# Patient Record
(demographics unavailable — no encounter records)

---

## 2024-11-03 NOTE — PHYSICAL EXAM
[No Rash or Lesion] : No rash or lesion [Alert] : alert [Oriented to Person] : oriented to person [Oriented to Place] : oriented to place [Oriented to Time] : oriented to time [Calm] : calm [de-identified] : No acute distress [de-identified] : Nonlabored breathing [de-identified] : soft, non-tender, non-distended, reducible left inguinal hernia

## 2024-11-03 NOTE — ASSESSMENT
[FreeTextEntry1] : This is a very pleasant 86F who is here for interval evaluation of left inguinal hernia. She now desires operative repair.  She expressed interest in open repair. The risks/benefits of this were discussed at length and she would like to proceed with open left inguinal hernia repair with mesh and all indicated procedures.  She will see her physician for preop clearance.

## 2024-11-03 NOTE — HISTORY OF PRESENT ILLNESS
[de-identified] : This is a very pleasant 86F who I had seen in the past for evaluation of left inguinal hernia. She has had this for a few years now, but it doesn't hurt her. She noticed a bulge that is always there but always reduces when she lies down. No episode of bulge that does not reduce, nausea, vomiting or obstructive symptoms or pain. She has noted some occasional discomfort in the pelvis that comes and goes and improves with BM that she is not sure is related. She is an active 86F.

## 2025-01-02 NOTE — HISTORY OF PRESENT ILLNESS
[No Pertinent Cardiac History] : no history of aortic stenosis, atrial fibrillation, coronary artery disease, recent myocardial infarction, or implantable device/pacemaker [No Adverse Anesthesia Reaction] : no adverse anesthesia reaction in self or family member [(Patient denies any chest pain, claudication, dyspnea on exertion, orthopnea, palpitations or syncope)] : Patient denies any chest pain, claudication, dyspnea on exertion, orthopnea, palpitations or syncope [Chronic Anticoagulation] : no chronic anticoagulation [Chronic Kidney Disease] : no chronic kidney disease [Diabetes] : no diabetes [FreeTextEntry4] : 85 yo F PMH HTN, HLD osteoporosis, carotid plaque, former smoker (reports social quit 12-15 years ago) presenting today for preoperative exam prior to hernia repair scheduled with Dr. Loving for 01/06/25.

## 2025-01-02 NOTE — ASSESSMENT
[High Risk Surgery - Intraperitoneal, Intrathoracic or Supringuinal Vascular Procedures] : High Risk Surgery - Intraperitoneal, Intrathoracic or Supringuinal Vascular Procedures - No (0) [Ischemic Heart Disease] : Ischemic Heart Disease - No (0) [Congestive Heart Failure] : Congestive Heart Failure - No (0) [Prior Cerebrovascular Accident or TIA] : Prior Cerebrovascular Accident or TIA - No (0) [Creatinine >= 2mg/dL (1 Point)] : Creatinine >= 2mg/dL - No (0) [Insulin-dependent Diabetic (1 Point)] : Insulin-dependent Diabetic - No (0) [0] : 0 , RCRI Class: I, Risk of Post-Op Cardiac Complications: 3.9%, 95% CI for Risk Estimate: 2.8% - 5.4% [As per surgery] : as per surgery [Patient Optimized for Surgery] : Patient optimized for surgery [FreeTextEntry4] : 85 yo F PMH HTN, HLD osteoporosis, carotid plaque, former smoker (reports social quit 12-15 years ago) presenting today for preoperative exam prior to hernia repair

## 2025-01-02 NOTE — PLAN
[FreeTextEntry1] : CBC and BMP wnl EKG sinus- similar to previous  patient is medically optimized as intermediate risk for planned procedure.

## 2025-01-02 NOTE — HISTORY OF PRESENT ILLNESS
[No Pertinent Cardiac History] : no history of aortic stenosis, atrial fibrillation, coronary artery disease, recent myocardial infarction, or implantable device/pacemaker [No Adverse Anesthesia Reaction] : no adverse anesthesia reaction in self or family member [(Patient denies any chest pain, claudication, dyspnea on exertion, orthopnea, palpitations or syncope)] : Patient denies any chest pain, claudication, dyspnea on exertion, orthopnea, palpitations or syncope [Chronic Anticoagulation] : no chronic anticoagulation [Chronic Kidney Disease] : no chronic kidney disease [Diabetes] : no diabetes [FreeTextEntry4] : 87 yo F PMH HTN, HLD osteoporosis, carotid plaque, former smoker (reports social quit 12-15 years ago) presenting today for preoperative exam prior to hernia repair scheduled with Dr. Loving for 01/06/25.

## 2025-04-02 NOTE — PROCEDURE
[FreeTextEntry3] : Large joint injection was performed  of the b/l knees. The indication for this procedure was x-ray evidence of Osteoarthritis on this or prior visit. The site was prepped with alcohol and betadine. An injection of Lidocaine 3cc of 1% , Euflexxa 20mg, # 1 was used.   Patient was advised to call if redness, pain or fever occur and apply ice for 15 minutes out of every hour for the next 12-24 hours as tolerated.   Patient has tried OTC's including aspirin, Ibuprofen, Aleve, etc or prescription NSAIDS, and/or exercises at home and/or physical therapy without satisfactory response, patient had decreased mobility in the joint and the risks benefits, and alternatives have been discussed, and verbal consent was obtained.   The risks, benefits and contents of the injection have been discussed.  Risks include but are not limited to allergic reaction, flare reaction, permanent white skin discoloration at the injection site and infection.  The patient understands the risks and agrees to having the injection.  All questions have been answered.

## 2025-04-02 NOTE — PHYSICAL EXAM
[Left] : left elbow [NL (150)] : flexion 150 degrees [NL (90)] : supination 90 degrees [Bilateral] : knee bilaterally [NL (0)] : extension 0 degrees [Negative] : negative Shashank's [] : no pain with varus stress [TWNoteComboBox7] : flexion 125 degrees

## 2025-04-02 NOTE — HISTORY OF PRESENT ILLNESS
[de-identified] : 04/02/2025 Ms. CHACORTA REDDY, a 86 year old RHD female, presents today for bilateral knee pain and left elbow pain. Knee pain began February 2025. no specific PATRICIA. Pain is described as sharp. worsens at night and with stairs. NSAIDS alleviate pain. Denies numbness/tingling. XRay of both knees done at [facility] in August 15, 2024. Sept. 2024, Pt saw Dr. Guerin for aspiration and bilateral CSI. Pt is interested in gel injections. Pt states she had an allergic reaction to cortisone. Left Elbow: pt states she hit elbow no a door approx. 3wks ago and still c/o medial elbow pain. Tender to touch.

## 2025-04-02 NOTE — IMAGING
[Left] : left elbow [Bilateral] : knee bilaterally [Outside films reviewed] : Outside films reviewed [There are no fractures, subluxations or dislocations. No significant abnormalities are seen] : There are no fractures, subluxations or dislocations. No significant abnormalities are seen [Degenerative change] : Degenerative change [FreeTextEntry1] : independent interpretation on 04/02/2025 [FreeTextEntry9] : NW 8/15/24 - independent interpretation on 04/02/2025

## 2025-04-02 NOTE — DISCUSSION/SUMMARY
[de-identified] : 86f with bn/l knee djd and left elbow contusion  Arthritis is a progressive problem that once occurs will be a chronic issue that will likely continue until surgical treatment is necessary. Treatment options for arthritis include OTC NSAIDS, prescription NSAIDS, ice, bracing, physical therapy, cortisone and/or visco injections, and surgery for joint replacement.  discussed availability of visco injections and pt would like to proceed.  Euflexxa #1 b/l knees Injection tolerated well. Post injection instructions reviewed. 1) wbat, cryotherapy 2) rtc 1 week to continue series   Entered by Enid Kraft acting as scribe. Dr. Espino- The documentation recorded by the scribe accurately reflects the service I personally performed and the decisions made by me.

## 2025-04-09 NOTE — HISTORY OF PRESENT ILLNESS
[de-identified] : 04/09/25: Pt here for B/L Knee pain and to recieve Euflexxa #2 b/l knee injection. Pt states she had little relief from first injection. 04/02/2025 Ms. CHACORTA REDDY, a 86 year old RHD female, presents today for bilateral knee pain and left elbow pain. Knee pain began February 2025. no specific PATRICIA. Pain is described as sharp. worsens at night and with stairs. NSAIDS alleviate pain. Denies numbness/tingling. XRay of both knees done at [facility] in August 15, 2024. Sept. 2024, Pt saw Dr. Guerin for aspiration and bilateral CSI. Pt is interested in gel injections. Pt states she had an allergic reaction to cortisone. Left Elbow: pt states she hit elbow no a door approx. 3wks ago and still c/o medial elbow pain. Tender to touch.

## 2025-04-09 NOTE — PHYSICAL EXAM
[Left] : left elbow [NL (150)] : flexion 150 degrees [NL (90)] : supination 90 degrees [Bilateral] : knee bilaterally [NL (0)] : extension 0 degrees [Negative] : negative Shashank's [] : no pain with varus stress [FreeTextEntry3] : moderate effusion right knee [TWNoteComboBox7] : flexion 125 degrees

## 2025-04-09 NOTE — PROCEDURE
[FreeTextEntry3] : Aspiration  was performed of the right knee. Aspiration was indicated due to effusion. The amount aspirated was 25 cc. The amount aspirated was clear nsf. Patient tolerated procedure well.  Large joint injection was performed  of the b/l knees. The indication for this procedure was x-ray evidence of Osteoarthritis on this or prior visit. The site was prepped with alcohol and betadine. An injection of Lidocaine 3cc of 1% , Euflexxa 20mg, # 2 was used.   Patient was advised to call if redness, pain or fever occur and apply ice for 15 minutes out of every hour for the next 12-24 hours as tolerated.   Patient has tried OTC's including aspirin, Ibuprofen, Aleve, etc or prescription NSAIDS, and/or exercises at home and/or physical therapy without satisfactory response, patient had decreased mobility in the joint and the risks benefits, and alternatives have been discussed, and verbal consent was obtained.   The risks, benefits and contents of the injection have been discussed.  Risks include but are not limited to allergic reaction, flare reaction, permanent white skin discoloration at the injection site and infection.  The patient understands the risks and agrees to having the injection.  All questions have been answered.

## 2025-04-09 NOTE — DISCUSSION/SUMMARY
[de-identified] : 86f with b/l knee djd . Today with acute exacerbation of right knee djd with effusion Arthritis is a progressive problem that once occurs will be a chronic issue that will likely continue until surgical treatment is necessary. Treatment options for arthritis include OTC NSAIDS, prescription NSAIDS, ice, bracing, physical therapy, cortisone and/or visco injections, and surgery for joint replacement.  discussed availability of visco injections and pt would like to proceed.  Euflexxa #2 b/l knees Injection tolerated well. Post injection instructions reviewed. 1) wbat, cryotherapy 2) rtc 1 week to continue series   aspiration 25cc right knee  Entered by Enid Kraft acting as scribe. Dr. Espino- The documentation recorded by the scribe accurately reflects the service I personally performed and the decisions made by me.

## 2025-04-18 NOTE — DISCUSSION/SUMMARY
[de-identified] : 86f with b/l knee djd . Today with acute exacerbation of right knee djd with effusion Arthritis is a progressive problem that once occurs will be a chronic issue that will likely continue until surgical treatment is necessary. Treatment options for arthritis include OTC NSAIDS, prescription NSAIDS, ice, bracing, physical therapy, cortisone and/or visco injections, and surgery for joint replacement.  discussed availability of visco injections and pt would like to proceed.  Euflexxa #3 b/l knees Injection tolerated well. Post injection instructions reviewed. 1) wbat, cryotherapy 2) rtc PRN - discussed timing of repeat injectons

## 2025-04-18 NOTE — HISTORY OF PRESENT ILLNESS
[de-identified] :  04/16/2025: Pt here for B/L knee pain and to receive Euflexxa #3 b/l knee injection. Pt states she has little to no changes in pain level. 04/09/25: Pt here for B/L Knee pain and to recieve Euflexxa #2 b/l knee injection. Pt states she had little relief from first injection. 04/02/2025 Ms. CHACORTA REDDY, a 86 year old RHD female, presents today for bilateral knee pain and left elbow pain. Knee pain began February 2025. no specific PATRICIA. Pain is described as sharp. worsens at night and with stairs. NSAIDS alleviate pain. Denies numbness/tingling. XRay of both knees done at [facility] in August 15, 2024. Sept. 2024, Pt saw Dr. Guerin for aspiration and bilateral CSI. Pt is interested in gel injections. Pt states she had an allergic reaction to cortisone. Left Elbow: pt states she hit elbow no a door approx. 3wks ago and still c/o medial elbow pain. Tender to touch.

## 2025-04-18 NOTE — PROCEDURE
[FreeTextEntry3] :  Large joint injection was performed  of the b/l knees. The indication for this procedure was x-ray evidence of Osteoarthritis on this or prior visit. The site was prepped with alcohol and betadine. An injection of Lidocaine 3cc of 1% , Euflexxa 20mg, # 3 was used.   Patient was advised to call if redness, pain or fever occur and apply ice for 15 minutes out of every hour for the next 12-24 hours as tolerated.   Patient has tried OTC's including aspirin, Ibuprofen, Aleve, etc or prescription NSAIDS, and/or exercises at home and/or physical therapy without satisfactory response, patient had decreased mobility in the joint and the risks benefits, and alternatives have been discussed, and verbal consent was obtained.   The risks, benefits and contents of the injection have been discussed.  Risks include but are not limited to allergic reaction, flare reaction, permanent white skin discoloration at the injection site and infection.  The patient understands the risks and agrees to having the injection.  All questions have been answered.

## 2025-04-18 NOTE — DISCUSSION/SUMMARY
[de-identified] : 86f with b/l knee djd . Today with acute exacerbation of right knee djd with effusion Arthritis is a progressive problem that once occurs will be a chronic issue that will likely continue until surgical treatment is necessary. Treatment options for arthritis include OTC NSAIDS, prescription NSAIDS, ice, bracing, physical therapy, cortisone and/or visco injections, and surgery for joint replacement.  discussed availability of visco injections and pt would like to proceed.  Euflexxa #3 b/l knees Injection tolerated well. Post injection instructions reviewed. 1) wbat, cryotherapy 2) rtc PRN - discussed timing of repeat injectons

## 2025-04-18 NOTE — HISTORY OF PRESENT ILLNESS
[de-identified] :  04/16/2025: Pt here for B/L knee pain and to receive Euflexxa #3 b/l knee injection. Pt states she has little to no changes in pain level. 04/09/25: Pt here for B/L Knee pain and to recieve Euflexxa #2 b/l knee injection. Pt states she had little relief from first injection. 04/02/2025 Ms. CHACORTA REDDY, a 86 year old RHD female, presents today for bilateral knee pain and left elbow pain. Knee pain began February 2025. no specific PATRICIA. Pain is described as sharp. worsens at night and with stairs. NSAIDS alleviate pain. Denies numbness/tingling. XRay of both knees done at [facility] in August 15, 2024. Sept. 2024, Pt saw Dr. Guerin for aspiration and bilateral CSI. Pt is interested in gel injections. Pt states she had an allergic reaction to cortisone. Left Elbow: pt states she hit elbow no a door approx. 3wks ago and still c/o medial elbow pain. Tender to touch.

## 2025-06-11 NOTE — REVIEW OF SYSTEMS
[Fever] : no fever [Chills] : no chills [FreeTextEntry9] : AM leg cramping  [Negative] : Genitourinary

## 2025-06-11 NOTE — PLAN
[FreeTextEntry1] : #HCM -lab work script provided -vaccinations:  Influenza: declined  Shingles - aware can receive at pharmacy, declined  TDAP: declined  PNA: discussed Prevnar 20, declined -depression screen negative -she does not wish to have colon cancer screening (age > 75) or breast cancer screening (age > 80) -advised to f/u with Derm for skin cancer screening, declines   #HTN  -controlled  -f/u CMP  -continue losartan-HCTZ 100-25mg QD   #Leg cramping  -only in the AM and resolves after waking  -discussed considering neuro, restless leg? declines, as symptoms only bother her for a short while daily  -discussed option of trialing off statin for 1 week to see if symptoms resolve, declines, she want to trial 10 mg of statin instead of 20 mg  -she will let me know if she notices a difference, advised if no difference to also let me know so we can increase back to 20 mg  #HLD  -f/u lipid panel and CMP  -plan as above  -on statin 10 mg QD

## 2025-06-11 NOTE — PHYSICAL EXAM
[Declined Breast Exam] : declined breast exam  [Soft] : abdomen soft [Non Tender] : non-tender [No Joint Swelling] : no joint swelling [Normal] : affect was normal and insight and judgment were intact

## 2025-06-11 NOTE — HEALTH RISK ASSESSMENT
[Former] : Former [Patient declined mammogram] : Patient declined mammogram [Patient declined bone density test] : Patient declined bone density test [Alone] : lives alone [Patient declined colonoscopy] : Patient declined colonoscopy [1 or 2 (0 pts)] : 1 or 2 (0 points) [2 - 4 times a month (2 pts)] : 2-4 times a month (2 points) [Never (0 pts)] : Never (0 points) [No] : In the past 12 months have you used drugs other than those required for medical reasons? No [No falls in past year] : Patient reported no falls in the past year [0] : 2) Feeling down, depressed, or hopeless: Not at all (0) [PHQ-2 Negative - No further assessment needed] : PHQ-2 Negative - No further assessment needed [[id number]] : 0 [Audit-CScore] : 2 [Yes] : Reviewed medication list for presence of high-risk medications. [None] : Patient does not have any barriers to medication adherence [de-identified] :  3 cigarettes a day quit 12-15 years ago. [Fully functional (bathing, dressing, toileting, transferring, walking, feeding)] : Fully functional (bathing, dressing, toileting, transferring, walking, feeding) [Fully functional (using the telephone, shopping, preparing meals, housekeeping, doing laundry, using] : Fully functional and needs no help or supervision to perform IADLs (using the telephone, shopping, preparing meals, housekeeping, doing laundry, using transportation, managing medications and managing finances) [MammogramDate] : 11/14 [ColonoscopyDate] : 11/99

## 2025-06-11 NOTE — ASSESSMENT
[Vaccines Reviewed] : Immunizations reviewed today. Please see immunization details in the vaccine log within the immunization flowsheet.  [FreeTextEntry1] : 85 yo F PMH HTN, HLD osteoporosis, carotid plaque, former smoker (reports social quit 12-15 years ago) presenting today for annual exam.

## 2025-06-11 NOTE — HISTORY OF PRESENT ILLNESS
[de-identified] : 87 yo F PMH HTN, HLD osteoporosis, carotid plaque, former smoker (reports social quit 12-15 years ago) presenting today for annual exam/follow-up on medications.     Recently she has been taking her statin at 20 mg (not cutting it in half).  She notes years of AM L calf cramping, worse in the past several months, only when lying in bed in the AM, then resolves after she gets up.   Denies calf swelling, tenderness, pain at any other time. She has been taking magnesium, did not notice a difference.